# Patient Record
Sex: FEMALE | Race: WHITE | NOT HISPANIC OR LATINO
[De-identification: names, ages, dates, MRNs, and addresses within clinical notes are randomized per-mention and may not be internally consistent; named-entity substitution may affect disease eponyms.]

---

## 2017-06-22 ENCOUNTER — RESULT REVIEW (OUTPATIENT)
Age: 52
End: 2017-06-22

## 2017-07-19 ENCOUNTER — RESULT REVIEW (OUTPATIENT)
Age: 52
End: 2017-07-19

## 2017-12-19 ENCOUNTER — RESULT REVIEW (OUTPATIENT)
Age: 52
End: 2017-12-19

## 2018-01-31 ENCOUNTER — RESULT REVIEW (OUTPATIENT)
Age: 53
End: 2018-01-31

## 2019-01-29 ENCOUNTER — RESULT REVIEW (OUTPATIENT)
Age: 54
End: 2019-01-29

## 2019-02-06 ENCOUNTER — RESULT REVIEW (OUTPATIENT)
Age: 54
End: 2019-02-06

## 2019-03-01 ENCOUNTER — RESULT REVIEW (OUTPATIENT)
Age: 54
End: 2019-03-01

## 2019-05-07 ENCOUNTER — APPOINTMENT (OUTPATIENT)
Dept: RHEUMATOLOGY | Facility: CLINIC | Age: 54
End: 2019-05-07
Payer: COMMERCIAL

## 2019-05-07 DIAGNOSIS — Z87.01 PERSONAL HISTORY OF PNEUMONIA (RECURRENT): ICD-10-CM

## 2019-05-07 DIAGNOSIS — Z86.59 PERSONAL HISTORY OF OTHER MENTAL AND BEHAVIORAL DISORDERS: ICD-10-CM

## 2019-05-07 DIAGNOSIS — Z78.9 OTHER SPECIFIED HEALTH STATUS: ICD-10-CM

## 2019-05-07 PROCEDURE — 99203 OFFICE O/P NEW LOW 30 MIN: CPT

## 2019-05-22 PROBLEM — Z86.59 HISTORY OF DEPRESSION: Status: RESOLVED | Noted: 2019-05-22 | Resolved: 2019-05-22

## 2019-05-22 PROBLEM — Z78.9 CURRENT NON-SMOKER: Status: ACTIVE | Noted: 2019-05-22

## 2019-05-22 PROBLEM — Z87.01 HISTORY OF PNEUMONIA: Status: RESOLVED | Noted: 2019-05-22 | Resolved: 2019-05-22

## 2019-05-23 LAB
25(OH)D3 SERPL-MCNC: 35.8 NG/ML
ALBUMIN SERPL ELPH-MCNC: 4.4 G/DL
ALP BLD-CCNC: 86 U/L
ALT SERPL-CCNC: 17 U/L
ANION GAP SERPL CALC-SCNC: 11 MMOL/L
AST SERPL-CCNC: 21 U/L
BASOPHILS # BLD AUTO: 0.02 K/UL
BASOPHILS NFR BLD AUTO: 0.4 %
BILIRUB SERPL-MCNC: 0.2 MG/DL
BUN SERPL-MCNC: 19 MG/DL
CALCIUM SERPL-MCNC: 10 MG/DL
CALCIUM SERPL-MCNC: 10 MG/DL
CHLORIDE SERPL-SCNC: 102 MMOL/L
CO2 SERPL-SCNC: 27 MMOL/L
CREAT SERPL-MCNC: 0.73 MG/DL
EOSINOPHIL # BLD AUTO: 0.25 K/UL
EOSINOPHIL NFR BLD AUTO: 4.6 %
GLUCOSE SERPL-MCNC: 102 MG/DL
HCT VFR BLD CALC: 40.2 %
HGB BLD-MCNC: 12.8 G/DL
IMM GRANULOCYTES NFR BLD AUTO: 0.2 %
LYMPHOCYTES # BLD AUTO: 2.36 K/UL
LYMPHOCYTES NFR BLD AUTO: 43.4 %
MAN DIFF?: NORMAL
MCHC RBC-ENTMCNC: 29.8 PG
MCHC RBC-ENTMCNC: 31.8 GM/DL
MCV RBC AUTO: 93.5 FL
MONOCYTES # BLD AUTO: 0.37 K/UL
MONOCYTES NFR BLD AUTO: 6.8 %
NEUTROPHILS # BLD AUTO: 2.43 K/UL
NEUTROPHILS NFR BLD AUTO: 44.6 %
PARATHYROID HORMONE INTACT: 23 PG/ML
PLATELET # BLD AUTO: 183 K/UL
POTASSIUM SERPL-SCNC: 4.5 MMOL/L
PROT SERPL-MCNC: 7 G/DL
RBC # BLD: 4.3 M/UL
RBC # FLD: 12.9 %
SODIUM SERPL-SCNC: 140 MMOL/L
TSH SERPL-ACNC: 2.31 UIU/ML
WBC # FLD AUTO: 5.44 K/UL

## 2019-05-24 ENCOUNTER — RX RENEWAL (OUTPATIENT)
Age: 54
End: 2019-05-24

## 2019-08-05 ENCOUNTER — APPOINTMENT (OUTPATIENT)
Dept: RHEUMATOLOGY | Facility: CLINIC | Age: 54
End: 2019-08-05
Payer: COMMERCIAL

## 2019-08-05 VITALS
HEART RATE: 51 BPM | DIASTOLIC BLOOD PRESSURE: 68 MMHG | TEMPERATURE: 98.2 F | BODY MASS INDEX: 24.1 KG/M2 | HEIGHT: 63 IN | WEIGHT: 136 LBS | SYSTOLIC BLOOD PRESSURE: 106 MMHG | OXYGEN SATURATION: 98 %

## 2019-08-05 PROCEDURE — 99213 OFFICE O/P EST LOW 20 MIN: CPT

## 2019-08-07 LAB
25(OH)D3 SERPL-MCNC: 34.3 NG/ML
ALBUMIN SERPL ELPH-MCNC: 4.2 G/DL
ALP BLD-CCNC: 70 U/L
ALT SERPL-CCNC: 24 U/L
ANION GAP SERPL CALC-SCNC: 11 MMOL/L
AST SERPL-CCNC: 23 U/L
BILIRUB SERPL-MCNC: 0.3 MG/DL
BUN SERPL-MCNC: 14 MG/DL
CALCIUM SERPL-MCNC: 9.5 MG/DL
CHLORIDE SERPL-SCNC: 102 MMOL/L
CO2 SERPL-SCNC: 29 MMOL/L
CREAT SERPL-MCNC: 0.83 MG/DL
GLUCOSE SERPL-MCNC: 98 MG/DL
POTASSIUM SERPL-SCNC: 4.4 MMOL/L
PROT SERPL-MCNC: 6.7 G/DL
SODIUM SERPL-SCNC: 142 MMOL/L

## 2019-09-26 ENCOUNTER — RESULT REVIEW (OUTPATIENT)
Age: 54
End: 2019-09-26

## 2019-10-30 ENCOUNTER — RESULT REVIEW (OUTPATIENT)
Age: 54
End: 2019-10-30

## 2019-11-04 ENCOUNTER — RX RENEWAL (OUTPATIENT)
Age: 54
End: 2019-11-04

## 2019-11-07 ENCOUNTER — INBOUND DOCUMENT (OUTPATIENT)
Age: 54
End: 2019-11-07

## 2019-11-25 ENCOUNTER — APPOINTMENT (OUTPATIENT)
Dept: GYNECOLOGIC ONCOLOGY | Facility: CLINIC | Age: 54
End: 2019-11-25
Payer: COMMERCIAL

## 2019-11-25 VITALS
HEIGHT: 63 IN | WEIGHT: 135 LBS | BODY MASS INDEX: 23.92 KG/M2 | SYSTOLIC BLOOD PRESSURE: 115 MMHG | DIASTOLIC BLOOD PRESSURE: 68 MMHG

## 2019-11-25 DIAGNOSIS — Z82.62 FAMILY HISTORY OF OSTEOPOROSIS: ICD-10-CM

## 2019-11-25 DIAGNOSIS — N83.201 UNSPECIFIED OVARIAN CYST, RIGHT SIDE: ICD-10-CM

## 2019-11-25 DIAGNOSIS — D25.2 SUBSEROSAL LEIOMYOMA OF UTERUS: ICD-10-CM

## 2019-11-25 DIAGNOSIS — Z80.0 FAMILY HISTORY OF MALIGNANT NEOPLASM OF DIGESTIVE ORGANS: ICD-10-CM

## 2019-11-25 DIAGNOSIS — Z86.39 PERSONAL HISTORY OF OTHER ENDOCRINE, NUTRITIONAL AND METABOLIC DISEASE: ICD-10-CM

## 2019-11-25 PROCEDURE — 57421 EXAM/BIOPSY OF VAG W/SCOPE: CPT

## 2019-11-25 PROCEDURE — 99204 OFFICE O/P NEW MOD 45 MIN: CPT | Mod: 25

## 2019-12-02 ENCOUNTER — APPOINTMENT (OUTPATIENT)
Dept: OTOLARYNGOLOGY | Facility: CLINIC | Age: 54
End: 2019-12-02
Payer: COMMERCIAL

## 2019-12-02 DIAGNOSIS — R93.0 ABNORMAL FINDINGS ON DIAGNOSTIC IMAGING OF SKULL AND HEAD, NOT ELSEWHERE CLASSIFIED: ICD-10-CM

## 2019-12-02 PROCEDURE — 92567 TYMPANOMETRY: CPT

## 2019-12-02 PROCEDURE — 99244 OFF/OP CNSLTJ NEW/EST MOD 40: CPT | Mod: 25

## 2019-12-02 PROCEDURE — 92584 ELECTROCOCHLEOGRAPHY: CPT

## 2019-12-02 PROCEDURE — 92557 COMPREHENSIVE HEARING TEST: CPT

## 2019-12-02 RX ORDER — UBIDECARENONE 30 MG
CAPSULE ORAL
Refills: 0 | Status: ACTIVE | COMMUNITY

## 2019-12-03 LAB — CORE LAB BIOPSY: NORMAL

## 2019-12-05 LAB
ACE BLD-CCNC: 50 U/L
CRP SERPL HS-MCNC: 0.48 MG/L

## 2019-12-06 ENCOUNTER — APPOINTMENT (OUTPATIENT)
Dept: NEUROLOGY | Facility: CLINIC | Age: 54
End: 2019-12-06
Payer: COMMERCIAL

## 2019-12-06 VITALS
BODY MASS INDEX: 23.92 KG/M2 | DIASTOLIC BLOOD PRESSURE: 71 MMHG | HEIGHT: 63 IN | SYSTOLIC BLOOD PRESSURE: 106 MMHG | WEIGHT: 135 LBS | HEART RATE: 67 BPM

## 2019-12-06 PROCEDURE — 99243 OFF/OP CNSLTJ NEW/EST LOW 30: CPT

## 2019-12-09 NOTE — PHYSICAL EXAM
[FreeTextEntry1] : Alert, awake, oriented in time, place and person with normal memory and language (fluent speech, normal repetition, comprehension and naming). Pupils are equal and reactive to light and accommodation. Fundus discs and retina are normal. Visual Fields are full upon confrontation testing. Extraocular movements are full in all gaze directions without nystagmus. Optokinetic nystagmus is normal with tape running  either direction. Facial sensations, jaw strength, facial movements, hearing, palate, neck, shoulder and tongue are normal and symmetrical. Neck is supple. Tone, bulk, strength, muscle spindle reflexes, in the extremities are normal. Sensations for touch, pin and vibration are normal. Finger-to-nose and heel-to-shin are normal. Rapid alternating movements are normal. There is no tremor. Romberg is negative. Tandem gait, heel-walking and toe-walking are normal.\par

## 2019-12-09 NOTE — HISTORY OF PRESENT ILLNESS
[FreeTextEntry1] : Began with symptoms of "head cold" on 10/11/2019; Told she had sinusitis and was given Cefprozil and Methylprednisolone 4 mg 21 blister pack, ear pressure sensation worsened and visited an ENT surgeon. She had consumed Chinese food on 10/27 and canned tomato soup with pasta on 10/28 and Sushi rolls with wonton soup and salad on 10/29 followed by diarrhea on 10/29 and 10/30.  At the time she had muffled hearing, dizziness whenever she moved her head, white noise and ear fullness. Hearing test was abnormal with marked low frequency hearing loss in the right ear. Tinnitus and later vertigo developed the next few days. completed prednisone and advised Diamox for Meniere's. Acupuncture X ray Cervical spine and later MRI cervical spine were done. Tinnitus worsened but on follow up Dr Looney found the right ear hearing had improved and the left had worsened. Later developed headache and scalp tenderness about a month since onset of this condition

## 2019-12-09 NOTE — DISCUSSION/SUMMARY
[FreeTextEntry1] : Recommend a trial of treatment for vestibular migraine. After discussion of side effects and the duration of treatment we agreed on a trial of depakote  mg in view of its rapid onset of action. Because has suffered a month long illness, a single agent might be insufficient; recommend use amitriptyline 10-20 mg every night as well and return with a headache diary after 1 month. Will communicate by phone email before.

## 2019-12-15 PROBLEM — R93.0 ABNORMAL MRI OF THE HEAD: Status: ACTIVE | Noted: 2019-12-15

## 2020-01-02 ENCOUNTER — APPOINTMENT (OUTPATIENT)
Dept: OTOLARYNGOLOGY | Facility: CLINIC | Age: 55
End: 2020-01-02
Payer: COMMERCIAL

## 2020-01-02 VITALS — SYSTOLIC BLOOD PRESSURE: 104 MMHG | DIASTOLIC BLOOD PRESSURE: 70 MMHG

## 2020-01-02 VITALS — HEIGHT: 63 IN | BODY MASS INDEX: 23.92 KG/M2 | WEIGHT: 135 LBS

## 2020-01-02 PROCEDURE — 99213 OFFICE O/P EST LOW 20 MIN: CPT

## 2020-01-02 NOTE — CONSULT LETTER
[Dear  ___] : Dear ~KAYLEY, [Consult Letter:] : I had the pleasure of evaluating your patient, [unfilled]. [Please see my note below.] : Please see my note below. [Consult Closing:] : Thank you very much for allowing me to participate in the care of this patient.  If you have any questions, please do not hesitate to contact me. [Sincerely,] : Sincerely, [FreeTextEntry2] : Julio Looney MD [FreeTextEntry3] : Walter Brunson MD, FACS\par Chair of Otolaryngology, [facility]\par Professor of Otolaryngology & Molecular Medicine, [employer] at [facility]\par \par
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
## 2020-01-13 LAB
ALBUMIN SERPL ELPH-MCNC: 4.1 G/DL
ALP BLD-CCNC: 54 U/L
ALT SERPL-CCNC: 17 U/L
ANION GAP SERPL CALC-SCNC: 12 MMOL/L
AST SERPL-CCNC: 20 U/L
BASOPHILS # BLD AUTO: 0.05 K/UL
BASOPHILS NFR BLD AUTO: 0.8 %
BILIRUB SERPL-MCNC: <0.2 MG/DL
BUN SERPL-MCNC: 17 MG/DL
CALCIUM SERPL-MCNC: 9.4 MG/DL
CHLORIDE SERPL-SCNC: 106 MMOL/L
CO2 SERPL-SCNC: 24 MMOL/L
CREAT SERPL-MCNC: 1 MG/DL
EOSINOPHIL # BLD AUTO: 0.29 K/UL
EOSINOPHIL NFR BLD AUTO: 4.6 %
GLUCOSE SERPL-MCNC: 94 MG/DL
HCT VFR BLD CALC: 42.2 %
HGB BLD-MCNC: 13.1 G/DL
IMM GRANULOCYTES NFR BLD AUTO: 0.3 %
LYMPHOCYTES # BLD AUTO: 2.76 K/UL
LYMPHOCYTES NFR BLD AUTO: 44.2 %
MAN DIFF?: NORMAL
MCHC RBC-ENTMCNC: 30 PG
MCHC RBC-ENTMCNC: 31 GM/DL
MCV RBC AUTO: 96.6 FL
MONOCYTES # BLD AUTO: 0.5 K/UL
MONOCYTES NFR BLD AUTO: 8 %
NEUTROPHILS # BLD AUTO: 2.62 K/UL
NEUTROPHILS NFR BLD AUTO: 42.1 %
PLATELET # BLD AUTO: 177 K/UL
POTASSIUM SERPL-SCNC: 4 MMOL/L
PROT SERPL-MCNC: 6.6 G/DL
RBC # BLD: 4.37 M/UL
RBC # FLD: 13.8 %
SODIUM SERPL-SCNC: 142 MMOL/L
WBC # FLD AUTO: 6.24 K/UL

## 2020-01-17 ENCOUNTER — APPOINTMENT (OUTPATIENT)
Dept: NEUROLOGY | Facility: CLINIC | Age: 55
End: 2020-01-17
Payer: COMMERCIAL

## 2020-01-17 VITALS
DIASTOLIC BLOOD PRESSURE: 67 MMHG | WEIGHT: 135 LBS | BODY MASS INDEX: 23.92 KG/M2 | HEIGHT: 63 IN | HEART RATE: 85 BPM | SYSTOLIC BLOOD PRESSURE: 101 MMHG

## 2020-01-17 PROCEDURE — 99213 OFFICE O/P EST LOW 20 MIN: CPT

## 2020-01-17 RX ORDER — UBIDECARENONE 400 MG
400 CAPSULE ORAL DAILY
Qty: 90 | Refills: 3 | Status: ACTIVE | COMMUNITY
Start: 2020-01-17 | End: 1900-01-01

## 2020-01-17 RX ORDER — OMEGA-3/DHA/EPA/FISH OIL 300-1000MG
400 CAPSULE ORAL
Qty: 100 | Refills: 1 | Status: ACTIVE | COMMUNITY
Start: 2020-01-17 | End: 1900-01-01

## 2020-01-19 NOTE — DISCUSSION/SUMMARY
[FreeTextEntry1] : Reviewed blood tests: LFT CBC show no direct toxicity attributable to Valproate. Recommend continue treatment for vestibular migraine : depakote  mg  and amitriptyline 10-20 mg every night as well and return with a headache diary after 1 month. Short course of prednisone to cut short current daily headaches. Warned there might be sleep disturbance due to prednisone. Also advise magnesium and CoQ10 daily.

## 2020-01-19 NOTE — HISTORY OF PRESENT ILLNESS
[FreeTextEntry1] : Reports she was free of headaches after beginning the medications but headaches started to develop triggered by lights at work when she returned to work. Still not as bad as it was before

## 2020-01-20 NOTE — HISTORY OF PRESENT ILLNESS
[de-identified] : 54 yr old returns for follow up of Meniere's/AIED- has been taking increased dose of diamox as ordered (250mg BID)- has had no vertigo episodes since last visit -still having some sensation of "off-balance" when looking down- lasts only seconds.  No noticeable hearing changes.  Tinnitus greatly improved- only hearing it very slightly at night when she focuses on it.  Seeing allergist tomorrow.  Had some difficutly following low salt diet during holidays-  feels she may have a slight headache from increased salt.  Due to abnormal MRI saw neurologist (Dr. Faulkner) - dx migraines- started patient on divalproex and amitriptyline - feels much better.  Aural fullness and tinnitus resolved.

## 2020-01-31 ENCOUNTER — TRANSCRIPTION ENCOUNTER (OUTPATIENT)
Age: 55
End: 2020-01-31

## 2020-02-21 ENCOUNTER — APPOINTMENT (OUTPATIENT)
Dept: NEUROLOGY | Facility: CLINIC | Age: 55
End: 2020-02-21
Payer: COMMERCIAL

## 2020-02-21 VITALS
HEIGHT: 63 IN | SYSTOLIC BLOOD PRESSURE: 118 MMHG | DIASTOLIC BLOOD PRESSURE: 73 MMHG | HEART RATE: 84 BPM | WEIGHT: 139 LBS | BODY MASS INDEX: 24.63 KG/M2

## 2020-02-21 PROCEDURE — 99213 OFFICE O/P EST LOW 20 MIN: CPT

## 2020-02-21 NOTE — DISCUSSION/SUMMARY
[FreeTextEntry1] : It appears that the episodes of daily headaches are controlled with current medications. She has been compliant with hydration and vitamin/supplements, and should be able to withstand tapering off Depakote.   Recommend tapering off Depakote as follows: every other day Depakote 500 mg ER for 2 weeks then stop Depakote. Continue amitriptyline reduced dose (20 mg) to address weight gain.  Return for follow up 1 month. \par \par I do recommend rescheduling plans to discontinue acetazolamide until after March visit and review of headache calendar.

## 2020-02-21 NOTE — HISTORY OF PRESENT ILLNESS
[FreeTextEntry1] : Brought headache diary on the "migraine miguel" dasha: 6 headaches since last visit; Week of Feb 7-14 she had almost daily headaches when she accidentally forgot to add acetazolamide to her pill tray. \par Rizatriptan did help very much in aborting headaches.

## 2020-03-02 ENCOUNTER — APPOINTMENT (OUTPATIENT)
Dept: OTOLARYNGOLOGY | Facility: CLINIC | Age: 55
End: 2020-03-02
Payer: COMMERCIAL

## 2020-03-02 VITALS
DIASTOLIC BLOOD PRESSURE: 44 MMHG | WEIGHT: 139 LBS | HEIGHT: 63 IN | BODY MASS INDEX: 24.63 KG/M2 | HEART RATE: 76 BPM | SYSTOLIC BLOOD PRESSURE: 107 MMHG

## 2020-03-02 PROCEDURE — 92567 TYMPANOMETRY: CPT

## 2020-03-02 PROCEDURE — 99213 OFFICE O/P EST LOW 20 MIN: CPT | Mod: 25

## 2020-03-02 PROCEDURE — 92557 COMPREHENSIVE HEARING TEST: CPT

## 2020-03-02 RX ORDER — ESCITALOPRAM OXALATE 10 MG/1
10 TABLET, FILM COATED ORAL
Refills: 0 | Status: DISCONTINUED | COMMUNITY
End: 2020-03-02

## 2020-03-02 RX ORDER — RIZATRIPTAN BENZOATE 10 MG/1
10 TABLET ORAL
Qty: 1 | Refills: 5 | Status: DISCONTINUED | COMMUNITY
Start: 2019-12-06 | End: 2020-03-02

## 2020-03-02 RX ORDER — PREDNISONE 20 MG/1
20 TABLET ORAL
Qty: 13 | Refills: 0 | Status: DISCONTINUED | COMMUNITY
Start: 2020-01-17 | End: 2020-03-02

## 2020-03-08 NOTE — DATA REVIEWED
[de-identified] : Mild HL at 8000Hz in both ears. Normal type A tymps in both ears\par stable symmetric SNHL\par

## 2020-03-08 NOTE — REASON FOR VISIT
[Subsequent Evaluation] : a subsequent evaluation for [FreeTextEntry2] :  follow up of Meniere's/AIED-

## 2020-03-08 NOTE — HISTORY OF PRESENT ILLNESS
[de-identified] : 54 year old female  follow up of Meniere's/AIED.  States saw her neurologist 2/21/2020 reduced dose of Depakote 500mg ER every other night for 2 weeks, then stop, March 7 will be her last night.   States no vertigo since last visit 1/2/2020.   States one week in February accidentally didn't take the Diamox and that week her headaches returned. Feels overall improved

## 2020-04-10 ENCOUNTER — APPOINTMENT (OUTPATIENT)
Dept: NEUROLOGY | Facility: CLINIC | Age: 55
End: 2020-04-10
Payer: COMMERCIAL

## 2020-04-10 PROCEDURE — 99213 OFFICE O/P EST LOW 20 MIN: CPT | Mod: 95

## 2020-04-10 RX ORDER — DIVALPROEX SODIUM 500 1/1
500 TABLET, EXTENDED RELEASE ORAL
Qty: 30 | Refills: 3 | Status: DISCONTINUED | COMMUNITY
Start: 2019-12-06 | End: 2020-03-14

## 2020-04-10 NOTE — PHYSICAL EXAM
[FreeTextEntry1] : Examination:\par Patient is well appearing\par Neurological Examination:\par Mental Status: Patient is awake and alert. Oriented to person, place, time. \par Language: No aphasia or paraphasic errors.\par Cranial Nerves:\par EOMI, No facial weakness, tongue protrudes in the midline, facial sensation intact\par Motor Exam: No pronator drift. Barrel roll intact. Fine motor movements intact in hands\par Able to stand up from chair without difficulty\par Sensory: denies sensory change in feet and hands fingers toes\par Reflexes: Unable to examine\par Cerebellar: Finger to nose intact bilaterally,\par Gait: Normal independent\par

## 2020-04-10 NOTE — DISCUSSION/SUMMARY
[FreeTextEntry1] : Migraine with aura improved with amitriptyline 20 mg. Recommend continue current medications and supplements and follow up in June/July when a schedule will be arranged to taper off the amitriptyline. Continue vitamins and supplements.

## 2020-04-10 NOTE — HISTORY OF PRESENT ILLNESS
[FreeTextEntry1] : For the past few weeks she has had to use rizatriptan only once; she is free of headaches and vertigo as well as dizziness; she is using diamox 500 mg and amitriptyline 20 mg every night, having tapered off Depakote in early March. She walks 2 miles a day with her  and also has begin some muscle restrengthening exercises. These are the only triggers - after or when doing her exercise for neck and back muscle she has a little dizziness and headache that goes away after she has had a little water

## 2020-08-14 ENCOUNTER — APPOINTMENT (OUTPATIENT)
Dept: GYNECOLOGIC ONCOLOGY | Facility: CLINIC | Age: 55
End: 2020-08-14
Payer: COMMERCIAL

## 2020-08-14 VITALS
BODY MASS INDEX: 23.91 KG/M2 | DIASTOLIC BLOOD PRESSURE: 67 MMHG | OXYGEN SATURATION: 99 % | SYSTOLIC BLOOD PRESSURE: 120 MMHG | HEART RATE: 85 BPM | WEIGHT: 135 LBS

## 2020-08-14 DIAGNOSIS — N89.8 OTHER SPECIFIED NONINFLAMMATORY DISORDERS OF VAGINA: ICD-10-CM

## 2020-08-14 DIAGNOSIS — N94.10 UNSPECIFIED DYSPAREUNIA: ICD-10-CM

## 2020-08-14 PROCEDURE — 57421 EXAM/BIOPSY OF VAG W/SCOPE: CPT

## 2020-08-14 PROCEDURE — 99213 OFFICE O/P EST LOW 20 MIN: CPT | Mod: 25

## 2020-08-15 PROBLEM — N94.10 DYSPAREUNIA IN FEMALE: Status: ACTIVE | Noted: 2020-08-15

## 2020-08-15 PROBLEM — N89.8 VAGINAL DRYNESS: Status: ACTIVE | Noted: 2020-08-15

## 2020-08-27 LAB
CORE LAB BIOPSY: NORMAL
CYTOLOGY CVX/VAG DOC THIN PREP: ABNORMAL

## 2020-09-14 ENCOUNTER — APPOINTMENT (OUTPATIENT)
Dept: OTOLARYNGOLOGY | Facility: CLINIC | Age: 55
End: 2020-09-14
Payer: COMMERCIAL

## 2020-09-14 VITALS — SYSTOLIC BLOOD PRESSURE: 106 MMHG | DIASTOLIC BLOOD PRESSURE: 71 MMHG | HEART RATE: 74 BPM

## 2020-09-14 PROCEDURE — 99214 OFFICE O/P EST MOD 30 MIN: CPT

## 2020-09-14 RX ORDER — ESCITALOPRAM OXALATE 10 MG/1
10 TABLET, FILM COATED ORAL
Refills: 0 | Status: DISCONTINUED | COMMUNITY
End: 2020-09-14

## 2020-09-18 ENCOUNTER — APPOINTMENT (OUTPATIENT)
Dept: RHEUMATOLOGY | Facility: CLINIC | Age: 55
End: 2020-09-18
Payer: COMMERCIAL

## 2020-09-18 PROCEDURE — 99213 OFFICE O/P EST LOW 20 MIN: CPT | Mod: 95

## 2020-09-25 ENCOUNTER — APPOINTMENT (OUTPATIENT)
Dept: NEUROLOGY | Facility: CLINIC | Age: 55
End: 2020-09-25
Payer: COMMERCIAL

## 2020-09-25 VITALS
BODY MASS INDEX: 23.92 KG/M2 | SYSTOLIC BLOOD PRESSURE: 108 MMHG | HEART RATE: 74 BPM | DIASTOLIC BLOOD PRESSURE: 69 MMHG | HEIGHT: 63 IN | WEIGHT: 135 LBS

## 2020-09-25 VITALS — TEMPERATURE: 97.6 F

## 2020-09-25 PROCEDURE — 99214 OFFICE O/P EST MOD 30 MIN: CPT

## 2020-09-25 RX ORDER — AMITRIPTYLINE HYDROCHLORIDE 10 MG/1
10 TABLET, FILM COATED ORAL
Qty: 180 | Refills: 3 | Status: DISCONTINUED | COMMUNITY
Start: 2019-12-06 | End: 2020-09-25

## 2020-09-25 RX ORDER — NORTRIPTYLINE HYDROCHLORIDE 10 MG/1
10 CAPSULE ORAL
Qty: 180 | Refills: 3 | Status: ACTIVE | COMMUNITY
Start: 2020-09-25 | End: 1900-01-01

## 2020-09-25 NOTE — HISTORY OF PRESENT ILLNESS
[de-identified] : 55F follow up for symmetric HF SNHL and migraine- continues with Diamox- hearing is stable- migraines are better but still present- last time was dizzy was in early August- when coming off Lexapro. Pt denies otalgia, otorrhea, ear infections,tinnitus, vertigo or headaches related to hearing.   Denies change in hearing - to see Dr Faulkner - to be seen soon.

## 2020-09-25 NOTE — DISCUSSION/SUMMARY
[FreeTextEntry1] : Decrease intensity of exercise a little by dropping weights. Change to nortriptyline 30 mg a night and avoid chocolate almonds unless drinking a lot of water- the same with one glass of wine. Keep record and return after 2 months

## 2020-09-25 NOTE — HISTORY OF PRESENT ILLNESS
[FreeTextEntry1] : Headaches once a week or less and once in July during downward dog position. Sept 14 headache after a few days of increased exercise consisting of weights and fast walk on treadmill, that was severe and lasted for 1 week. Stayed away from work one day but continued working other days. On 24 Sept with brisk walk on treadmill and suffered a headache after chocolate almond headache. Pulsating headache and neck pain 6/10 lasting 3 days.

## 2020-12-02 ENCOUNTER — APPOINTMENT (OUTPATIENT)
Dept: NEUROLOGY | Facility: CLINIC | Age: 55
End: 2020-12-02
Payer: COMMERCIAL

## 2020-12-02 VITALS
WEIGHT: 136 LBS | HEART RATE: 73 BPM | BODY MASS INDEX: 24.1 KG/M2 | SYSTOLIC BLOOD PRESSURE: 117 MMHG | HEIGHT: 63 IN | DIASTOLIC BLOOD PRESSURE: 71 MMHG

## 2020-12-02 VITALS — TEMPERATURE: 97.5 F

## 2020-12-02 PROCEDURE — 99214 OFFICE O/P EST MOD 30 MIN: CPT

## 2020-12-02 PROCEDURE — 99072 ADDL SUPL MATRL&STAF TM PHE: CPT

## 2020-12-02 NOTE — PHYSICAL EXAM
[FreeTextEntry1] : \par Alert, awake, oriented in time, place and person with normal memory and language (fluent speech, normal repetition, comprehension and naming). Pupils are equal and reactive to light and accommodation. Fundus discs and retina are normal. Visual Fields are full upon confrontation testing. Extraocular movements are full in all gaze directions without nystagmus. . Facial sensations, jaw strength, facial movements, hearing, palate, neck, shoulder and tongue are normal and symmetrical. Neck is supple. Tone, bulk, strength, muscle spindle reflexes, in the extremities are normal. Sensations for touch, pin and vibration are normal. Finger-to-nose and heel-to-shin are normal. Rapid alternating movements are normal. There is no tremor. Romberg is negative. Tandem gait, heel-walking and toe-walking are normal.\par

## 2020-12-02 NOTE — HISTORY OF PRESENT ILLNESS
[FreeTextEntry1] : Only 5 headaches since  last visit most of which occurred soon after the start of treatment, 2 of which were triggered by either wine or beer. Has since stopped all alcohol. Using nortriptyline and propranolol

## 2020-12-02 NOTE — DISCUSSION/SUMMARY
[FreeTextEntry1] : Successful response to nortriptyline and propranolol for vestibular migraine after topiramate failed. Advised continue current schedule and return for follow up after visit with Dr Brunson

## 2021-02-22 ENCOUNTER — APPOINTMENT (OUTPATIENT)
Dept: GYNECOLOGIC ONCOLOGY | Facility: CLINIC | Age: 56
End: 2021-02-22
Payer: COMMERCIAL

## 2021-02-22 VITALS
HEIGHT: 63 IN | WEIGHT: 135 LBS | BODY MASS INDEX: 23.92 KG/M2 | SYSTOLIC BLOOD PRESSURE: 105 MMHG | DIASTOLIC BLOOD PRESSURE: 71 MMHG

## 2021-02-22 DIAGNOSIS — N95.2 POSTMENOPAUSAL ATROPHIC VAGINITIS: ICD-10-CM

## 2021-02-22 PROCEDURE — 57420 EXAM OF VAGINA W/SCOPE: CPT

## 2021-02-22 PROCEDURE — 99072 ADDL SUPL MATRL&STAF TM PHE: CPT

## 2021-02-22 PROCEDURE — 99213 OFFICE O/P EST LOW 20 MIN: CPT | Mod: 25

## 2021-02-23 PROBLEM — N95.2 VAGINAL ATROPHY: Status: ACTIVE | Noted: 2019-11-25

## 2021-02-23 LAB — HPV HIGH+LOW RISK DNA PNL CVX: DETECTED

## 2021-02-28 ENCOUNTER — NON-APPOINTMENT (OUTPATIENT)
Age: 56
End: 2021-02-28

## 2021-02-28 LAB — CYTOLOGY CVX/VAG DOC THIN PREP: ABNORMAL

## 2021-03-10 ENCOUNTER — APPOINTMENT (OUTPATIENT)
Dept: OTOLARYNGOLOGY | Facility: CLINIC | Age: 56
End: 2021-03-10
Payer: COMMERCIAL

## 2021-03-10 VITALS — DIASTOLIC BLOOD PRESSURE: 56 MMHG | BODY MASS INDEX: 23.21 KG/M2 | WEIGHT: 131 LBS | SYSTOLIC BLOOD PRESSURE: 100 MMHG

## 2021-03-10 DIAGNOSIS — R42 DIZZINESS AND GIDDINESS: ICD-10-CM

## 2021-03-10 DIAGNOSIS — H81.01 MENIERE'S DISEASE, RIGHT EAR: ICD-10-CM

## 2021-03-10 PROCEDURE — 99213 OFFICE O/P EST LOW 20 MIN: CPT | Mod: 25

## 2021-03-10 PROCEDURE — 92567 TYMPANOMETRY: CPT

## 2021-03-10 PROCEDURE — 99072 ADDL SUPL MATRL&STAF TM PHE: CPT

## 2021-03-10 PROCEDURE — 92557 COMPREHENSIVE HEARING TEST: CPT

## 2021-03-10 NOTE — REASON FOR VISIT
[Subsequent Evaluation] : a subsequent evaluation for [Hearing Loss] : hearing loss [FreeTextEntry2] : MALGORZATA

## 2021-03-10 NOTE — HISTORY OF PRESENT ILLNESS
[de-identified] : 55F follow up for symmetric HF SNHL and migraine- continues with Diamox 250mg BID - no c/o ear fullness, hearing change or vertigo- on nortryptaline for migraines- migraines are lessened in frequency (avg 7-10/month) - in Jan/Feb 2021 patient c/o of increased frontal headaches and also at the base of the back of her head (different from her migraines)- felt they have been related to increased stress in life - recently placed on a muscle relaxant (methocarbamol) by PCP which has helped her headaches.

## 2021-05-12 ENCOUNTER — RX RENEWAL (OUTPATIENT)
Age: 56
End: 2021-05-12

## 2021-05-12 RX ORDER — RIZATRIPTAN BENZOATE 10 MG/1
10 TABLET ORAL
Qty: 36 | Refills: 0 | Status: ACTIVE | COMMUNITY
Start: 2020-02-21 | End: 1900-01-01

## 2021-05-23 ENCOUNTER — RX RENEWAL (OUTPATIENT)
Age: 56
End: 2021-05-23

## 2021-05-26 ENCOUNTER — APPOINTMENT (OUTPATIENT)
Dept: NEUROLOGY | Facility: CLINIC | Age: 56
End: 2021-05-26
Payer: COMMERCIAL

## 2021-05-26 ENCOUNTER — RX RENEWAL (OUTPATIENT)
Age: 56
End: 2021-05-26

## 2021-05-26 VITALS
HEIGHT: 63 IN | HEART RATE: 74 BPM | WEIGHT: 131 LBS | SYSTOLIC BLOOD PRESSURE: 112 MMHG | BODY MASS INDEX: 23.21 KG/M2 | DIASTOLIC BLOOD PRESSURE: 76 MMHG

## 2021-05-26 VITALS — TEMPERATURE: 97.2 F

## 2021-05-26 PROCEDURE — 99214 OFFICE O/P EST MOD 30 MIN: CPT

## 2021-05-26 PROCEDURE — 99072 ADDL SUPL MATRL&STAF TM PHE: CPT

## 2021-05-26 NOTE — DISCUSSION/SUMMARY
[FreeTextEntry1] : I reminded her of her recovery from dizziness hearing loss and vertigo that had failed treatment using acetazolamide, until she added divalproex at first and later switched to nortriptyline 30 mg a night. At the same time she also saw headaches were reduced in severity and frequency. Vestibular migraine as an alternative diagnosis was discussed. We agreed that she could taper off nortriptyline by decreasing the night dose by 10 mg a day for a week at a time. Should the dizziness/vertigo or headaches her chief advised done to the current nortriptyline. Followup was arranged after 4 months

## 2021-05-26 NOTE — PHYSICAL EXAM
[General Appearance - Alert] : alert [General Appearance - In No Acute Distress] : in no acute distress [Oriented To Time, Place, And Person] : oriented to person, place, and time [Impaired Insight] : insight and judgment were intact [Affect] : the affect was normal [Person] : oriented to person [Place] : oriented to place [Time] : oriented to time [Concentration Intact] : normal concentrating ability [Visual Intact] : visual attention was ~T not ~L decreased [Naming Objects] : no difficulty naming common objects [Repeating Phrases] : no difficulty repeating a phrase [Writing A Sentence] : no difficulty writing a sentence [Fluency] : fluency intact [Comprehension] : comprehension intact [Reading] : reading intact [Past History] : adequate knowledge of personal past history [Cranial Nerves Optic (II)] : visual acuity intact bilaterally,  visual fields full to confrontation, pupils equal round and reactive to light [Cranial Nerves Oculomotor (III)] : extraocular motion intact [Cranial Nerves Trigeminal (V)] : facial sensation intact symmetrically [Cranial Nerves Facial (VII)] : face symmetrical [Cranial Nerves Vestibulocochlear (VIII)] : hearing was intact bilaterally [Cranial Nerves Glossopharyngeal (IX)] : tongue and palate midline [Cranial Nerves Hypoglossal (XII)] : there was no tongue deviation with protrusion [Cranial Nerves Accessory (XI - Cranial And Spinal)] : head turning and shoulder shrug symmetric [Motor Tone] : muscle tone was normal in all four extremities [Motor Strength] : muscle strength was normal in all four extremities [No Muscle Atrophy] : normal bulk in all four extremities [Sensation Tactile Decrease] : light touch was intact [Abnormal Walk] : normal gait [Balance] : balance was intact [2+] : Ankle jerk left 2+ [Past-pointing] : there was no past-pointing [Tremor] : no tremor present [Plantar Reflex Right Only] : normal on the right [Plantar Reflex Left Only] : normal on the left [FreeTextEntry1] : Bilateral occipital neuralgia

## 2021-05-26 NOTE — HISTORY OF PRESENT ILLNESS
[FreeTextEntry1] : She reports that she remains free of dizziness and balance problems. She was recently evaluated by Dr. Brunson who reportedly told her that she does not have hearing loss anymore and discontinued the acetazolamide. She has noticed neck pain and shoulder pain that she attributes to her posture at work. She was also told about a sway-back in her  lower back:she believes this could alsot cause some neck and shoulder pain as well. Deep tissue massage and methocarbamol have apparently helped her neck pain.\par \par

## 2021-06-24 ENCOUNTER — APPOINTMENT (OUTPATIENT)
Dept: RHEUMATOLOGY | Facility: CLINIC | Age: 56
End: 2021-06-24

## 2021-10-22 ENCOUNTER — APPOINTMENT (OUTPATIENT)
Dept: RHEUMATOLOGY | Facility: CLINIC | Age: 56
End: 2021-10-22

## 2021-12-15 ENCOUNTER — RESULT REVIEW (OUTPATIENT)
Age: 56
End: 2021-12-15

## 2022-01-05 ENCOUNTER — APPOINTMENT (OUTPATIENT)
Dept: NEUROLOGY | Facility: CLINIC | Age: 57
End: 2022-01-05

## 2022-02-02 ENCOUNTER — APPOINTMENT (OUTPATIENT)
Dept: NEUROLOGY | Facility: CLINIC | Age: 57
End: 2022-02-02
Payer: COMMERCIAL

## 2022-02-02 VITALS
BODY MASS INDEX: 22.68 KG/M2 | DIASTOLIC BLOOD PRESSURE: 75 MMHG | HEIGHT: 63 IN | RESPIRATION RATE: 16 BRPM | HEART RATE: 69 BPM | WEIGHT: 128 LBS | SYSTOLIC BLOOD PRESSURE: 117 MMHG

## 2022-02-02 PROCEDURE — 99213 OFFICE O/P EST LOW 20 MIN: CPT

## 2022-02-02 NOTE — DISCUSSION/SUMMARY
[FreeTextEntry1] : She will use my cell # to call for refills should she run into trouble again.  She will return for follow-up after 3 months.

## 2022-02-02 NOTE — HISTORY OF PRESENT ILLNESS
[FreeTextEntry1] :  Having tapered down and discontinued nortriptyline without recurrence of headache last year she developed recurrence of all symptoms when her mother fell ill.she resumed the nortriptyline and felt better.\par \par Recently she had difficulty getting her nortriptyline refilled.  Her nortriptyline renewal was sent to pharmacy in [place] that she has used only once when she was on vacation but that had been on her [facility] record as the pharmacy of choice.  She was forced to discontinue nortriptyline abruptly because she did not have refills.  As result she is suffered significant recurrence of headache and other symptoms that she attributes to attribute discontinuation of nortriptyline.  She was using 30 mg of nortriptyline at night.\par \par After correction of the problem she is just restarted the refills she received

## 2022-03-04 ENCOUNTER — APPOINTMENT (OUTPATIENT)
Dept: GYNECOLOGIC ONCOLOGY | Facility: CLINIC | Age: 57
End: 2022-03-04
Payer: COMMERCIAL

## 2022-03-04 VITALS
SYSTOLIC BLOOD PRESSURE: 118 MMHG | BODY MASS INDEX: 22.68 KG/M2 | HEIGHT: 63 IN | HEART RATE: 90 BPM | WEIGHT: 128 LBS | DIASTOLIC BLOOD PRESSURE: 68 MMHG

## 2022-03-04 PROCEDURE — 99213 OFFICE O/P EST LOW 20 MIN: CPT | Mod: 25

## 2022-03-04 PROCEDURE — 57420 EXAM OF VAGINA W/SCOPE: CPT

## 2022-03-04 RX ORDER — ALENDRONATE SODIUM 70 MG/1
70 TABLET ORAL
Qty: 4 | Refills: 0 | Status: DISCONTINUED | COMMUNITY
Start: 2019-05-24 | End: 2022-03-04

## 2022-03-04 RX ORDER — ALENDRONATE SODIUM 70 MG/1
70 TABLET ORAL
Refills: 0 | Status: DISCONTINUED | COMMUNITY
End: 2022-03-04

## 2022-03-04 RX ORDER — DIVALPROEX SODIUM 500 1/1
500 TABLET, EXTENDED RELEASE ORAL
Qty: 30 | Refills: 0 | Status: DISCONTINUED | COMMUNITY
Start: 2021-12-31 | End: 2022-03-04

## 2022-03-04 RX ORDER — METHOCARBAMOL 750 MG/1
TABLET, FILM COATED ORAL
Refills: 0 | Status: DISCONTINUED | COMMUNITY
End: 2022-03-04

## 2022-03-04 RX ORDER — ACETAZOLAMIDE 250 MG/1
250 TABLET ORAL
Qty: 180 | Refills: 3 | Status: DISCONTINUED | COMMUNITY
Start: 2019-12-02 | End: 2022-03-04

## 2022-03-08 ENCOUNTER — NON-APPOINTMENT (OUTPATIENT)
Age: 57
End: 2022-03-08

## 2022-03-09 ENCOUNTER — APPOINTMENT (OUTPATIENT)
Dept: OTOLARYNGOLOGY | Facility: CLINIC | Age: 57
End: 2022-03-09
Payer: COMMERCIAL

## 2022-03-09 VITALS
BODY MASS INDEX: 22.86 KG/M2 | WEIGHT: 129 LBS | DIASTOLIC BLOOD PRESSURE: 82 MMHG | HEIGHT: 63 IN | SYSTOLIC BLOOD PRESSURE: 122 MMHG | HEART RATE: 91 BPM

## 2022-03-09 DIAGNOSIS — H90.5 UNSPECIFIED SENSORINEURAL HEARING LOSS: ICD-10-CM

## 2022-03-09 DIAGNOSIS — R51.9 HEADACHE, UNSPECIFIED: ICD-10-CM

## 2022-03-09 DIAGNOSIS — G43.111 MIGRAINE WITH AURA, INTRACTABLE, WITH STATUS MIGRAINOSUS: ICD-10-CM

## 2022-03-09 DIAGNOSIS — G43.109 MIGRAINE WITH AURA, NOT INTRACTABLE, W/OUT STATUS MIGRAINOSUS: ICD-10-CM

## 2022-03-09 PROCEDURE — 92557 COMPREHENSIVE HEARING TEST: CPT

## 2022-03-09 PROCEDURE — 92567 TYMPANOMETRY: CPT

## 2022-03-09 PROCEDURE — 99213 OFFICE O/P EST LOW 20 MIN: CPT | Mod: 25

## 2022-03-09 RX ORDER — BACILLUS COAGULANS/INULIN 1B-250 MG
CAPSULE ORAL
Refills: 0 | Status: DISCONTINUED | COMMUNITY
End: 2022-03-09

## 2022-03-09 NOTE — HISTORY OF PRESENT ILLNESS
[de-identified] : 57 yo F with history of symmetric HF SNHL and migraine. Hearing at last visit was normal. No noticeable change in hearing. No longer takes diuretics, but feels off balance with stairs and yoga -. Last episode of dizziness was in later Feb -lasting 5-10 minutes. Migraines 4-5 times a month and last one was on Monday. Seeing Dr. Faulkner for vestibular migraines.

## 2022-03-09 NOTE — DATA REVIEWED
[de-identified] : Bilateral -mild to moderate sensorineural hearing loss after 4000Hz\par Impedance testing reveals normal Type A tympanograms bilaterally\par

## 2022-03-25 NOTE — REASON FOR VISIT
[Initial Consultation] : an initial consultation for [Spouse] : spouse [FreeTextEntry2] : referred by Dr. Looney for possible AIED- accompanied with h/a's, tinnitus, neck pain, ear fullness (left ear) JULIO (acute kidney injury)

## 2022-08-10 ENCOUNTER — APPOINTMENT (OUTPATIENT)
Dept: NEUROLOGY | Facility: CLINIC | Age: 57
End: 2022-08-10

## 2022-08-10 VITALS
SYSTOLIC BLOOD PRESSURE: 120 MMHG | HEART RATE: 81 BPM | WEIGHT: 129 LBS | DIASTOLIC BLOOD PRESSURE: 71 MMHG | HEIGHT: 63 IN | BODY MASS INDEX: 22.86 KG/M2

## 2022-08-10 PROCEDURE — 99214 OFFICE O/P EST MOD 30 MIN: CPT

## 2022-08-10 NOTE — DISCUSSION/SUMMARY
[FreeTextEntry1] : Discussed options of adding a second medication or changing to a different medications, but agreed now to continue current dose, hydration, exercise etc and return for a follow up for beginning  tapering

## 2022-08-10 NOTE — PHYSICAL EXAM
[Person] : oriented to person [Place] : oriented to place [Time] : oriented to time [Concentration Intact] : normal concentrating ability [Visual Intact] : visual attention was ~T not ~L decreased [Naming Objects] : no difficulty naming common objects [Repeating Phrases] : no difficulty repeating a phrase [Writing A Sentence] : no difficulty writing a sentence [Fluency] : fluency intact [Comprehension] : comprehension intact [Reading] : reading intact [Past History] : adequate knowledge of personal past history [Cranial Nerves Optic (II)] : visual acuity intact bilaterally,  visual fields full to confrontation, pupils equal round and reactive to light [Cranial Nerves Oculomotor (III)] : extraocular motion intact [Cranial Nerves Trigeminal (V)] : facial sensation intact symmetrically [Cranial Nerves Facial (VII)] : face symmetrical [Cranial Nerves Vestibulocochlear (VIII)] : hearing was intact bilaterally [Cranial Nerves Glossopharyngeal (IX)] : tongue and palate midline [Cranial Nerves Accessory (XI - Cranial And Spinal)] : head turning and shoulder shrug symmetric [Cranial Nerves Hypoglossal (XII)] : there was no tongue deviation with protrusion [Motor Tone] : muscle tone was normal in all four extremities [Motor Strength] : muscle strength was normal in all four extremities [No Muscle Atrophy] : normal bulk in all four extremities [Sensation Tactile Decrease] : light touch was intact [Abnormal Walk] : normal gait [Balance] : balance was intact [Past-pointing] : there was no past-pointing [Tremor] : no tremor present [2+] : Ankle jerk left 2+ [Plantar Reflex Right Only] : normal on the right [Plantar Reflex Left Only] : normal on the left [Sclera] : the sclera and conjunctiva were normal [PERRL With Normal Accommodation] : pupils were equal in size, round, reactive to light, with normal accommodation [Extraocular Movements] : extraocular movements were intact [Outer Ear] : the ears and nose were normal in appearance [Oropharynx] : the oropharynx was normal [Neck Appearance] : the appearance of the neck was normal [Neck Cervical Mass (___cm)] : no neck mass was observed [Jugular Venous Distention Increased] : there was no jugular-venous distention [Thyroid Diffuse Enlargement] : the thyroid was not enlarged [Thyroid Nodule] : there were no palpable thyroid nodules [Auscultation Breath Sounds / Voice Sounds] : lungs were clear to auscultation bilaterally [Heart Rate And Rhythm] : heart rate was normal and rhythm regular [Heart Sounds] : normal S1 and S2 [Heart Sounds Gallop] : no gallops [Murmurs] : no murmurs [Heart Sounds Pericardial Friction Rub] : no pericardial rub [Full Pulse] : the pedal pulses are present [Edema] : there was no peripheral edema [Bowel Sounds] : normal bowel sounds [Abdomen Soft] : soft [Abdomen Tenderness] : non-tender [] : no hepato-splenomegaly [Abdomen Mass (___ Cm)] : no abdominal mass palpated [No CVA Tenderness] : no ~M costovertebral angle tenderness [No Spinal Tenderness] : no spinal tenderness

## 2022-08-10 NOTE — HISTORY OF PRESENT ILLNESS
[FreeTextEntry1] : Continues to use nortriptyline 30 mg a night. Has been using triptan because of excessive computer use. But has not had vertigo. Went out on medical leave in May ad headaches frequency improved dramatically. Walking and yoga magnesium and B2 as well as CoQ10. Now 3  a month down from 4 .

## 2023-01-26 ENCOUNTER — APPOINTMENT (OUTPATIENT)
Dept: SURGERY | Facility: CLINIC | Age: 58
End: 2023-01-26
Payer: COMMERCIAL

## 2023-01-26 VITALS
DIASTOLIC BLOOD PRESSURE: 71 MMHG | SYSTOLIC BLOOD PRESSURE: 118 MMHG | HEART RATE: 85 BPM | BODY MASS INDEX: 22.86 KG/M2 | HEIGHT: 63 IN | WEIGHT: 129 LBS | OXYGEN SATURATION: 96 %

## 2023-01-26 VITALS — TEMPERATURE: 97.3 F

## 2023-01-26 DIAGNOSIS — R10.32 LEFT LOWER QUADRANT PAIN: ICD-10-CM

## 2023-01-26 PROCEDURE — 99202 OFFICE O/P NEW SF 15 MIN: CPT

## 2023-01-26 NOTE — PHYSICAL EXAM
[JVD] : no jugular venous distention  [Normal Thyroid] : the thyroid was normal [Carotid Bruits] : no carotid bruits Patent [Normal Breath Sounds] : Normal breath sounds [Normal Heart Sounds] : normal heart sounds [Normal Rate and Rhythm] : normal rate and rhythm [No Rash or Lesion] : No rash or lesion [Alert] : alert [Oriented to Person] : oriented to person [Oriented to Place] : oriented to place [Oriented to Time] : oriented to time [Calm] : calm [de-identified] : well developed white female in no distress [de-identified] : noninjected and nonicteric [de-identified] : without adenopathy [de-identified] : normal bowel sounds, without distension or tenderness. [de-identified] : right sided repair intact, with left groin pain, without palpable lump or hernia [de-identified] : without calf pain or swelling

## 2023-01-26 NOTE — REVIEW OF SYSTEMS
[Arthralgias] : arthralgias [Joint Swelling] : no joint swelling [Joint Stiffness] : no joint stiffness [Limb Pain] : no limb pain [Limb Swelling] : no limb swelling [Negative] : Heme/Lymph

## 2023-01-26 NOTE — HISTORY OF PRESENT ILLNESS
[de-identified] : left groin pain and lump [de-identified] : 57 year old white female who noticed a painful left groin lump three weeks ago. Pt states it increases in size and causes more pain with activity. Denies any alleviating factors. She denies any nausea or vomiting or changes in her bowel habits. Without urinary symptoms.

## 2023-01-27 ENCOUNTER — APPOINTMENT (OUTPATIENT)
Dept: RHEUMATOLOGY | Facility: CLINIC | Age: 58
End: 2023-01-27
Payer: COMMERCIAL

## 2023-01-27 VITALS
HEART RATE: 86 BPM | OXYGEN SATURATION: 97 % | WEIGHT: 133 LBS | SYSTOLIC BLOOD PRESSURE: 113 MMHG | BODY MASS INDEX: 23.57 KG/M2 | HEIGHT: 63 IN | DIASTOLIC BLOOD PRESSURE: 64 MMHG

## 2023-01-27 PROCEDURE — 99213 OFFICE O/P EST LOW 20 MIN: CPT

## 2023-01-31 NOTE — HISTORY OF PRESENT ILLNESS
[FreeTextEntry1] : stopped taking Alendronate before bone last bone density testing was taken \par bone density was done 2021 and was unchanged from 2019\par offers no complaints \par reports consuming a healthy diet \par exercises regularly \par

## 2023-01-31 NOTE — ASSESSMENT
[FreeTextEntry1] : off therapy for a year \par repeat DEXA prior to considering additional therapy \par continue with exercise, light weights/resistance exercises encouraged \par continue with calcium and vitamin D \par OV after imaging.

## 2023-02-15 ENCOUNTER — APPOINTMENT (OUTPATIENT)
Dept: NEUROLOGY | Facility: CLINIC | Age: 58
End: 2023-02-15
Payer: COMMERCIAL

## 2023-02-15 VITALS
HEIGHT: 63 IN | SYSTOLIC BLOOD PRESSURE: 128 MMHG | DIASTOLIC BLOOD PRESSURE: 79 MMHG | HEART RATE: 70 BPM | WEIGHT: 130 LBS | BODY MASS INDEX: 23.04 KG/M2

## 2023-02-15 PROCEDURE — 99214 OFFICE O/P EST MOD 30 MIN: CPT

## 2023-02-15 RX ORDER — RIZATRIPTAN BENZOATE 10 MG/1
10 TABLET ORAL
Qty: 36 | Refills: 0 | Status: ACTIVE | COMMUNITY
Start: 2022-02-02 | End: 1900-01-01

## 2023-02-15 NOTE — HISTORY OF PRESENT ILLNESS
[FreeTextEntry1] : Triggers alcohol and salty food and excessive yoga all in sequence followed recurrence of headache then betternow headache free

## 2023-02-17 ENCOUNTER — NON-APPOINTMENT (OUTPATIENT)
Age: 58
End: 2023-02-17

## 2023-02-27 ENCOUNTER — APPOINTMENT (OUTPATIENT)
Dept: RHEUMATOLOGY | Facility: CLINIC | Age: 58
End: 2023-02-27

## 2023-02-27 DIAGNOSIS — M81.0 AGE-RELATED OSTEOPOROSIS W/OUT CURRENT PATHOLOGICAL FRACTURE: ICD-10-CM

## 2023-03-08 ENCOUNTER — APPOINTMENT (OUTPATIENT)
Dept: OTOLARYNGOLOGY | Facility: CLINIC | Age: 58
End: 2023-03-08
Payer: COMMERCIAL

## 2023-03-08 VITALS
BODY MASS INDEX: 23.04 KG/M2 | HEART RATE: 61 BPM | SYSTOLIC BLOOD PRESSURE: 132 MMHG | HEIGHT: 63 IN | DIASTOLIC BLOOD PRESSURE: 75 MMHG | WEIGHT: 130 LBS

## 2023-03-08 DIAGNOSIS — H90.3 SENSORINEURAL HEARING LOSS, BILATERAL: ICD-10-CM

## 2023-03-08 PROCEDURE — 99213 OFFICE O/P EST LOW 20 MIN: CPT

## 2023-03-08 PROCEDURE — 92567 TYMPANOMETRY: CPT

## 2023-03-08 PROCEDURE — 92557 COMPREHENSIVE HEARING TEST: CPT

## 2023-03-16 PROBLEM — H90.3 BILATERAL HIGH FREQUENCY SENSORINEURAL HEARING LOSS: Status: ACTIVE | Noted: 2023-03-16

## 2023-03-16 NOTE — HISTORY OF PRESENT ILLNESS
[de-identified] : 57 year old female with history of symmetric HF SNHL and migraines. Continues seeing Dr. Faulkner for vestibular migraines. Reports improvement with frequency of headaches. Takes Rizatriptan PRN. States hearing have been stable. Denies otalgia, otorrhea and vertigo. Had migraines following trip and diet changes -

## 2023-03-30 ENCOUNTER — APPOINTMENT (OUTPATIENT)
Dept: SURGERY | Facility: CLINIC | Age: 58
End: 2023-03-30
Payer: COMMERCIAL

## 2023-03-30 PROCEDURE — 99212 OFFICE O/P EST SF 10 MIN: CPT

## 2023-03-30 RX ORDER — CLINDAMYCIN PHOSPHATE 10 MG/ML
1 LOTION TOPICAL TWICE DAILY
Qty: 1 | Refills: 3 | Status: ACTIVE | COMMUNITY
Start: 2023-03-30 | End: 1900-01-01

## 2023-03-30 RX ORDER — DOXYCYCLINE 100 MG/1
100 CAPSULE ORAL TWICE DAILY
Qty: 14 | Refills: 0 | Status: ACTIVE | COMMUNITY
Start: 2023-03-30 | End: 1900-01-01

## 2023-03-30 NOTE — PHYSICAL EXAM
[Normal Breath Sounds] : Normal breath sounds [Normal Heart Sounds] : normal heart sounds [Normal Rate and Rhythm] : normal rate and rhythm [Calm] : calm [de-identified] : well developed white female in no acute distress [de-identified] : benign [de-identified] : area of inflammation-hydraadenitis

## 2023-03-31 ENCOUNTER — APPOINTMENT (OUTPATIENT)
Dept: GYNECOLOGIC ONCOLOGY | Facility: CLINIC | Age: 58
End: 2023-03-31
Payer: COMMERCIAL

## 2023-04-05 ENCOUNTER — NON-APPOINTMENT (OUTPATIENT)
Age: 58
End: 2023-04-05

## 2023-04-05 ENCOUNTER — APPOINTMENT (OUTPATIENT)
Dept: GYNECOLOGIC ONCOLOGY | Facility: CLINIC | Age: 58
End: 2023-04-05
Payer: COMMERCIAL

## 2023-04-05 VITALS
HEIGHT: 63 IN | HEART RATE: 84 BPM | SYSTOLIC BLOOD PRESSURE: 111 MMHG | BODY MASS INDEX: 23.04 KG/M2 | DIASTOLIC BLOOD PRESSURE: 66 MMHG | WEIGHT: 130 LBS

## 2023-04-05 PROCEDURE — 99213 OFFICE O/P EST LOW 20 MIN: CPT | Mod: 25

## 2023-04-05 PROCEDURE — 57420 EXAM OF VAGINA W/SCOPE: CPT

## 2023-04-07 LAB — HPV HIGH+LOW RISK DNA PNL CVX: NOT DETECTED

## 2023-04-11 ENCOUNTER — APPOINTMENT (OUTPATIENT)
Dept: SURGERY | Facility: CLINIC | Age: 58
End: 2023-04-11
Payer: COMMERCIAL

## 2023-04-11 VITALS — TEMPERATURE: 98.2 F

## 2023-04-11 VITALS — WEIGHT: 130 LBS | HEIGHT: 63 IN | BODY MASS INDEX: 23.04 KG/M2

## 2023-04-11 DIAGNOSIS — L73.2 HIDRADENITIS SUPPURATIVA: ICD-10-CM

## 2023-04-11 PROCEDURE — 99212 OFFICE O/P EST SF 10 MIN: CPT

## 2023-04-11 NOTE — HISTORY OF PRESENT ILLNESS
[de-identified] : Hydradenitis Left Groin [de-identified] : Patient feeling well.  Patient completed po ABX and has been using Clocin-T as needed.  Patient doing warm soaks 1 to 2 times daily.  Patient denies fever, chills and redness has resolved in area.

## 2023-04-11 NOTE — PLAN
[FreeTextEntry1] : -Review signs and symptoms of hydradenitis.  Recommend patient start warm soaks asap.  Patient instructed to call office if she develops swelling or pain in affected areas.\par -Patient to f/u prn

## 2023-04-11 NOTE — PHYSICAL EXAM
[Normal Breath Sounds] : Normal breath sounds [Normal Heart Sounds] : normal heart sounds [Normal Rate and Rhythm] : normal rate and rhythm [Alert] : alert [Oriented to Person] : oriented to person [Oriented to Place] : oriented to place [Oriented to Time] : oriented to time [Calm] : calm [de-identified] : well developed white female in no acute distress [de-identified] : Area Left groin clean,  no erythema, swelling or drainage

## 2023-04-17 LAB — CYTOLOGY CVX/VAG DOC THIN PREP: ABNORMAL

## 2023-04-18 ENCOUNTER — NON-APPOINTMENT (OUTPATIENT)
Age: 58
End: 2023-04-18

## 2023-09-20 ENCOUNTER — APPOINTMENT (OUTPATIENT)
Dept: NEUROLOGY | Facility: CLINIC | Age: 58
End: 2023-09-20
Payer: COMMERCIAL

## 2023-09-20 VITALS
DIASTOLIC BLOOD PRESSURE: 74 MMHG | HEIGHT: 63 IN | SYSTOLIC BLOOD PRESSURE: 108 MMHG | WEIGHT: 133 LBS | HEART RATE: 69 BPM | TEMPERATURE: 98.4 F | OXYGEN SATURATION: 98 % | BODY MASS INDEX: 23.57 KG/M2

## 2023-09-20 PROCEDURE — 99214 OFFICE O/P EST MOD 30 MIN: CPT

## 2023-09-20 RX ORDER — RISEDRONATE SODIUM 129; 21 MG/1; MG/1
TABLET, FILM COATED ORAL
Refills: 0 | Status: ACTIVE | COMMUNITY

## 2023-09-22 RX ORDER — RIMEGEPANT SULFATE 75 MG/75MG
75 TABLET, ORALLY DISINTEGRATING ORAL DAILY
Qty: 15 | Refills: 3 | Status: ACTIVE | COMMUNITY
Start: 2023-09-20 | End: 1900-01-01

## 2023-10-04 ENCOUNTER — NON-APPOINTMENT (OUTPATIENT)
Age: 58
End: 2023-10-04

## 2023-10-05 ENCOUNTER — APPOINTMENT (OUTPATIENT)
Dept: SURGERY | Facility: CLINIC | Age: 58
End: 2023-10-05

## 2023-10-16 ENCOUNTER — APPOINTMENT (OUTPATIENT)
Dept: GYNECOLOGIC ONCOLOGY | Facility: CLINIC | Age: 58
End: 2023-10-16

## 2023-10-16 ENCOUNTER — APPOINTMENT (OUTPATIENT)
Dept: GYNECOLOGIC ONCOLOGY | Facility: CLINIC | Age: 58
End: 2023-10-16
Payer: COMMERCIAL

## 2023-10-16 VITALS
BODY MASS INDEX: 23.04 KG/M2 | HEIGHT: 63 IN | HEART RATE: 81 BPM | DIASTOLIC BLOOD PRESSURE: 75 MMHG | SYSTOLIC BLOOD PRESSURE: 118 MMHG | WEIGHT: 130 LBS

## 2023-10-16 PROCEDURE — 99213 OFFICE O/P EST LOW 20 MIN: CPT

## 2023-10-16 RX ORDER — NORTRIPTYLINE HYDROCHLORIDE 10 MG/1
10 CAPSULE ORAL
Qty: 270 | Refills: 3 | Status: DISCONTINUED | COMMUNITY
Start: 2020-12-02 | End: 2023-10-16

## 2023-10-16 RX ORDER — NORTRIPTYLINE HYDROCHLORIDE 10 MG/1
10 CAPSULE ORAL
Qty: 270 | Refills: 3 | Status: DISCONTINUED | COMMUNITY
Start: 2022-02-02 | End: 2023-10-16

## 2023-10-16 RX ORDER — NORTRIPTYLINE HYDROCHLORIDE 10 MG/1
10 CAPSULE ORAL
Qty: 90 | Refills: 3 | Status: DISCONTINUED | COMMUNITY
Start: 2021-12-31 | End: 2023-10-16

## 2023-10-16 RX ORDER — DENOSUMAB 60 MG/ML
60 INJECTION SUBCUTANEOUS
Qty: 1 | Refills: 1 | Status: DISCONTINUED | COMMUNITY
Start: 2023-02-17 | End: 2023-10-16

## 2023-10-16 RX ORDER — NORTRIPTYLINE HYDROCHLORIDE 75 MG/1
CAPSULE ORAL
Refills: 0 | Status: ACTIVE | COMMUNITY

## 2023-10-16 RX ORDER — RIZATRIPTAN BENZOATE 10 MG/1
10 TABLET ORAL
Qty: 12 | Refills: 0 | Status: DISCONTINUED | COMMUNITY
Start: 2021-12-31 | End: 2023-10-16

## 2023-10-25 LAB — CYTOLOGY CVX/VAG DOC THIN PREP: ABNORMAL

## 2023-11-15 ENCOUNTER — APPOINTMENT (OUTPATIENT)
Dept: NEUROLOGY | Facility: CLINIC | Age: 58
End: 2023-11-15
Payer: COMMERCIAL

## 2023-11-15 VITALS
SYSTOLIC BLOOD PRESSURE: 127 MMHG | DIASTOLIC BLOOD PRESSURE: 75 MMHG | HEART RATE: 67 BPM | TEMPERATURE: 98.6 F | OXYGEN SATURATION: 98 % | WEIGHT: 131 LBS | BODY MASS INDEX: 23.21 KG/M2 | HEIGHT: 63 IN

## 2023-11-15 PROCEDURE — 64615 CHEMODENERV MUSC MIGRAINE: CPT

## 2023-11-15 PROCEDURE — 99212 OFFICE O/P EST SF 10 MIN: CPT | Mod: 25

## 2024-03-21 RX ORDER — ONABOTULINUMTOXINA 200 [USP'U]/1
200 INJECTION, POWDER, LYOPHILIZED, FOR SOLUTION INTRADERMAL; INTRAMUSCULAR
Qty: 1 | Refills: 3 | Status: ACTIVE | COMMUNITY
Start: 2023-09-20 | End: 1900-01-01

## 2024-03-28 ENCOUNTER — TRANSCRIPTION ENCOUNTER (OUTPATIENT)
Age: 59
End: 2024-03-28

## 2024-04-01 ENCOUNTER — APPOINTMENT (OUTPATIENT)
Dept: NEUROLOGY | Facility: CLINIC | Age: 59
End: 2024-04-01

## 2024-04-01 PROBLEM — R87.619 ABNORMAL PAP SMEAR OF CERVIX: Status: ACTIVE | Noted: 2019-11-25

## 2024-04-01 PROBLEM — B97.7 HIGH RISK HPV INFECTION: Status: ACTIVE | Noted: 2019-11-25

## 2024-04-01 PROBLEM — N88.2 CERVICAL STENOSIS (UTERINE CERVIX): Status: ACTIVE | Noted: 2019-11-25

## 2024-04-01 NOTE — HISTORY OF PRESENT ILLNESS
[FreeTextEntry1] : Referring MD/GYN- Dr. Anita Giang PCP- Dr. James Sow Neuro- Dr. Richy Vallecillo Rheum- Dr. Karolina Padron  HPI: She RTO feeling well, noting no VB, VD, or pain. She has not seen SP. She is remaining very active, having retired, doing yoga and strength training. No GI or  concerns reported.   Hx:2023: Ms. MIQUEL CIFUENTES  is a 57 year old  LMP at 52 who presents for follow-up.  She was previously seen in 2022.  She notes a recent GYN examination approximately November with reassuring findings.  She is not sure as to whether Pap testing was performed.  She reports no GI or  concerns.  She notes no vaginal bleeding, discharge, or pain.  She does note having been treated for folliculitis of the vulva related to waxing.  She reports being active, doing yoga, and is without limitations by her report.  The most recent Pap on 10/16/23 was ASCUS  Pap smear on 2019 was ASC-H.  Attempted colposcopy was in adequate due to small cervix that was flush with the vagina.    Pap Smear (2017, [facility]): LSIL, HPV mRNA detected  Colposcopy (2017, [facility]): ECC: rare benign reactive squamous cells and endocervical glands Cervix, 2 oclock: benign, no transformation zone identified   Pap Smear (2017): ASC-US, HPV mRNA detected  Colpo (2018, [facility]): Cervix: benign endocervical tissue and squamous epithelium   Pap (2019, [facility]): ASC-H, HPV mRNA detected  Colposcopy (2019, [facility]): ECC: benign Cervix, 1 oclock: benign Cervix, 10 oclock: benign   LEEP (3/1/2019, [facility]):  ECC: benign, superficial fragments of benign squamous epithelium, rare benign endocervical cells, and denuded stromal tissue with changes compatible with prior procedure LEEP: partially denuded benign transformation zone mucosa with focal granulation tissue, hemosiderin laden macrophages and thermal artifact compatible with changes from prior procedure Cervix, canal biopsy: predominantly denuded fibrovascular tissue with focal atrophic appearing squamous mucosa  Pelvic US (3/12/2019, [facility]): -2.38 x 2.38 x 1.93 cm SS fibroid -1.8 x 1.9 x 1.7 cm right cyst   Pap Smear (2019, [facility]): ASC-H, HPV mRNA detected  Pelvic US (2019, [facility]): -2.42 x 2.12 x 2.04 cm subserosal fundal -right ovarian cyst 1.41 x 1.40 x 1.29 cm  Endocervix (10/30/2019, [facility]): benign endocervical epithelial cells with squamous metaplasia.  Scant benign squamous epithelium   Ms. CIFUENTES has a previous GYN history of a right ovarian cyst that is being monitored via ultrasound.   Today, Ms. CIFUENTES has no complaints.  Denies vaginal bleeding/discharge, postcoital bleeding, abdominal pain/bloating/distention, pelvic discomfort, changes in normal bowel/urinary habits, nausea/vomiting, unintentional weight gain/loss, and all other associated signs and symptoms.  Health Maintenance: Mammo- 2019, BIRADS 2 Colonoscopy- 2018  Int Hx: She RTO last seen in Aug 2020 for eval and reports feeling well. No VB or VD. No pain. ROS: notable for vaginal dryness. Aug 2020 - ASC-H; Vag Bx bgn.  Int Hx: She RTO last seen 2021, Dec 2021: pap- ASCUS, HRHPV (+). No VB or BD. No pain.   Int Hx: She RTO. PAP on 23 was ASCUS, HrHPV negative

## 2024-04-01 NOTE — PHYSICAL EXAM
[Chaperone Present] : A chaperone was present in the examining room during all aspects of the physical examination [FreeTextEntry1] : DC [Absent] : CVAT: absent [Normal] : Anus and perineum: Normal sphincter tone, no masses, no prolapse. [de-identified] : [employer] [de-identified] : Os scarred closed and essentially flush with the vagina [de-identified] : atrophy [de-identified] : The uterus was not palpably enlarged  [de-identified] : The adnexae were nonpalpable

## 2024-04-01 NOTE — DISCUSSION/SUMMARY
[Reviewed Clinical Lab Test(s)] : Results of clinical tests were reviewed. [FreeTextEntry1] : I met with the pt. -We again reviewed her clinical examination, incl the stenosis, as well as the other reassuring findings. We reviewed the prior Pap and HPV results. We have noted the vag atrophy as well.  -We await the Pap from today for further disposition.   -Her instructions were reviewed, including precautions/symptoms to watch for and report. -All Q/A. -She will return to the office in 6 months and see her primary gynecologist for WWC.

## 2024-04-01 NOTE — PAST MEDICAL HISTORY
[Postmenopausal] : The patient is postmenopausal [Menarche Age ____] : age at menarche was [unfilled] [Menopause Age____] : age at menopause was [unfilled] [Regular Cycle Intervals] : have been regular [Normal Amount/Duration] : it was of a normal amount and duration [Oral Contraceptives] : uses oral contraceptives [Total Preg ___] : G[unfilled] [Full Term ___] : Full Term: [unfilled] [Living ___] : Living: [unfilled] [de-identified] : ~ 10 years

## 2024-04-08 ENCOUNTER — APPOINTMENT (OUTPATIENT)
Dept: GYNECOLOGIC ONCOLOGY | Facility: CLINIC | Age: 59
End: 2024-04-08

## 2024-04-08 DIAGNOSIS — B97.7 PAPILLOMAVIRUS AS THE CAUSE OF DISEASES CLASSIFIED ELSEWHERE: ICD-10-CM

## 2024-04-08 DIAGNOSIS — N88.2 STRICTURE AND STENOSIS OF CERVIX UTERI: ICD-10-CM

## 2024-04-08 DIAGNOSIS — R87.619 UNSPECIFIED ABNORMAL CYTOLOGICAL FINDINGS IN SPECIMENS FROM CERVIX UTERI: ICD-10-CM

## 2024-07-09 ENCOUNTER — NON-APPOINTMENT (OUTPATIENT)
Age: 59
End: 2024-07-09

## 2024-07-10 ENCOUNTER — APPOINTMENT (OUTPATIENT)
Dept: NEUROLOGY | Facility: CLINIC | Age: 59
End: 2024-07-10

## 2024-07-10 VITALS
SYSTOLIC BLOOD PRESSURE: 115 MMHG | DIASTOLIC BLOOD PRESSURE: 71 MMHG | HEIGHT: 63 IN | OXYGEN SATURATION: 99 % | WEIGHT: 135 LBS | BODY MASS INDEX: 23.92 KG/M2 | HEART RATE: 71 BPM

## 2024-07-10 PROCEDURE — 99213 OFFICE O/P EST LOW 20 MIN: CPT

## 2024-10-21 NOTE — REVIEW OF SYSTEMS
What Type Of Note Output Would You Prefer (Optional)?: Standard Output How Severe Is Your Skin Lesion?: mild Has Your Skin Lesion Been Treated?: not been treated Is This A New Presentation, Or A Follow-Up?: Skin Lesion [Negative] : Integumentary Is This A New Presentation, Or A Follow-Up?: Growths

## 2024-11-20 ENCOUNTER — TRANSCRIPTION ENCOUNTER (OUTPATIENT)
Age: 59
End: 2024-11-20

## 2025-01-08 ENCOUNTER — APPOINTMENT (OUTPATIENT)
Dept: NEUROLOGY | Facility: CLINIC | Age: 60
End: 2025-01-08
Payer: COMMERCIAL

## 2025-01-08 PROCEDURE — 99213 OFFICE O/P EST LOW 20 MIN: CPT | Mod: 95
